# Patient Record
Sex: MALE | Race: WHITE
[De-identification: names, ages, dates, MRNs, and addresses within clinical notes are randomized per-mention and may not be internally consistent; named-entity substitution may affect disease eponyms.]

---

## 2017-04-17 ENCOUNTER — HOSPITAL ENCOUNTER (OUTPATIENT)
Dept: HOSPITAL 62 - END | Age: 37
Discharge: HOME | End: 2017-04-17
Attending: INTERNAL MEDICINE
Payer: OTHER GOVERNMENT

## 2017-04-17 VITALS — SYSTOLIC BLOOD PRESSURE: 156 MMHG | DIASTOLIC BLOOD PRESSURE: 84 MMHG

## 2017-04-17 DIAGNOSIS — K64.8: Primary | ICD-10-CM

## 2017-04-17 DIAGNOSIS — K60.2: ICD-10-CM

## 2017-04-17 DIAGNOSIS — E66.9: ICD-10-CM

## 2017-04-17 DIAGNOSIS — Z79.899: ICD-10-CM

## 2017-04-17 DIAGNOSIS — K62.5: ICD-10-CM

## 2017-04-17 DIAGNOSIS — I10: ICD-10-CM

## 2017-04-17 PROCEDURE — 88305 TISSUE EXAM BY PATHOLOGIST: CPT

## 2017-04-17 PROCEDURE — 0DBF8ZX EXCISION OF RIGHT LARGE INTESTINE, VIA NATURAL OR ARTIFICIAL OPENING ENDOSCOPIC, DIAGNOSTIC: ICD-10-PCS | Performed by: INTERNAL MEDICINE

## 2017-04-17 PROCEDURE — 45380 COLONOSCOPY AND BIOPSY: CPT

## 2017-04-17 NOTE — OPERATIVE REPORT
Operative Report


DATE OF SURGERY: 04/17/17


Operative Report: 


The risks, benefits and alternatives of the procedure including risks of 

bleeding, perforation requiring surgery are explained to the patient detail and 

informed consents obtained.  Patient is taken back to the endoscopy suite and 

placed in a left, lateral decubital position.  Timeout is called.  Propofol 

medications administered.  A rectal examination was done which did not reveal 

any masses, tears or fissures.  An Olympus videoscope was inserted into the 

patient's rectum.  The scope was then gradually advanced all the way to the 

cecum.  The cecum was identified by the usual anatomical landmarks of the 

ileocecal valve as well as the appendiceal office.  Photodocumentation is 

obtained.  The scope was then sequentially pulled back creative rest was of the 

colon including the ascending colon, hepatic flexure, transverse colon, splenic 

flexure, descending colon and finally into the rectosigmoid portions of the 

colon.  Retroflexion maneuvers performed.


PREOPERATIVE DIAGNOSIS: Rectal bleeding


POSTOPERATIVE DIAGNOSIS: Internal hemorrhoids, anal fissure.  No masses, AVMs, 

diverticulosis noted.  No malignancy noted.


OPERATION: Colonoscopy with biopsy


TISSUE REMOVED OR ALTERED: Right-sided colon mucosal biopsy obtained


COMPLICATIONS: 


None.


ESTIMATED BLOOD LOSS: none.


INTRAOPERATIVE FINDINGS: No masses, AVMs, diverticulosis noted.


PROCEDURE: 


Patient tolerated procedure well.


No immediate postprocedure complications are noted.


Patient is discharged in good condition.


Discharge date 04/17/2017.


Discharge diet: Regular.


Discharge activity: Regular.


2-3 week follow-up to discuss findings.


We'll await on biopsies.


Patient is instructed to call the office or proceed to the emergency room 

should there be any further problems or questions.

## 2019-01-05 ENCOUNTER — HOSPITAL ENCOUNTER (EMERGENCY)
Dept: HOSPITAL 62 - ER | Age: 39
Discharge: HOME | End: 2019-01-05
Payer: COMMERCIAL

## 2019-01-05 VITALS — SYSTOLIC BLOOD PRESSURE: 135 MMHG | DIASTOLIC BLOOD PRESSURE: 79 MMHG

## 2019-01-05 DIAGNOSIS — R11.2: Primary | ICD-10-CM

## 2019-01-05 DIAGNOSIS — Z87.891: ICD-10-CM

## 2019-01-05 DIAGNOSIS — R19.7: ICD-10-CM

## 2019-01-05 DIAGNOSIS — R10.9: ICD-10-CM

## 2019-01-05 DIAGNOSIS — I10: ICD-10-CM

## 2019-01-05 DIAGNOSIS — R42: ICD-10-CM

## 2019-01-05 LAB
ABSOLUTE LYMPHOCYTES# (MANUAL): 0.3 10^3/UL (ref 0.5–4.7)
ABSOLUTE MONOCYTES # (MANUAL): 0 10^3/UL (ref 0.1–1.4)
ABSOLUTE NEUTROPHILS# (MANUAL): 13.3 10^3/UL (ref 1.7–8.2)
ADD MANUAL DIFF: YES
ALBUMIN SERPL-MCNC: 4.9 G/DL (ref 3.5–5)
ALP SERPL-CCNC: 73 U/L (ref 38–126)
ALT SERPL-CCNC: 31 U/L (ref 21–72)
ANION GAP SERPL CALC-SCNC: 10 MMOL/L (ref 5–19)
AST SERPL-CCNC: 26 U/L (ref 17–59)
BASOPHILS NFR BLD MANUAL: 0 % (ref 0–2)
BILIRUB DIRECT SERPL-MCNC: 0.3 MG/DL (ref 0–0.4)
BILIRUB SERPL-MCNC: 1 MG/DL (ref 0.2–1.3)
BUN SERPL-MCNC: 24 MG/DL (ref 7–20)
CALCIUM: 9.7 MG/DL (ref 8.4–10.2)
CHLORIDE SERPL-SCNC: 101 MMOL/L (ref 98–107)
CO2 SERPL-SCNC: 26 MMOL/L (ref 22–30)
EOSINOPHIL NFR BLD MANUAL: 1 % (ref 0–6)
ERYTHROCYTE [DISTWIDTH] IN BLOOD BY AUTOMATED COUNT: 12.7 % (ref 11.5–14)
GLUCOSE SERPL-MCNC: 116 MG/DL (ref 75–110)
HCT VFR BLD CALC: 47.8 % (ref 37.9–51)
HGB BLD-MCNC: 16.2 G/DL (ref 13.5–17)
LIPASE SERPL-CCNC: 57 U/L (ref 23–300)
MCH RBC QN AUTO: 30 PG (ref 27–33.4)
MCHC RBC AUTO-ENTMCNC: 34 G/DL (ref 32–36)
MCV RBC AUTO: 88 FL (ref 80–97)
MONOCYTES % (MANUAL): 0 % (ref 3–13)
PLATELET # BLD: 332 10^3/UL (ref 150–450)
PLATELET COMMENT: ADEQUATE
POTASSIUM SERPL-SCNC: 5.2 MMOL/L (ref 3.6–5)
PROT SERPL-MCNC: 8.4 G/DL (ref 6.3–8.2)
RBC # BLD AUTO: 5.41 10^6/UL (ref 4.35–5.55)
RBC MORPH BLD: (no result)
SEGMENTED NEUTROPHILS % (MAN): 97 % (ref 42–78)
SODIUM SERPL-SCNC: 136.9 MMOL/L (ref 137–145)
TOTAL CELLS COUNTED BLD: 100
VARIANT LYMPHS NFR BLD MANUAL: 2 % (ref 13–45)
WBC # BLD AUTO: 13.7 10^3/UL (ref 4–10.5)

## 2019-01-05 PROCEDURE — 36415 COLL VENOUS BLD VENIPUNCTURE: CPT

## 2019-01-05 PROCEDURE — 85025 COMPLETE CBC W/AUTO DIFF WBC: CPT

## 2019-01-05 PROCEDURE — 96361 HYDRATE IV INFUSION ADD-ON: CPT

## 2019-01-05 PROCEDURE — 99284 EMERGENCY DEPT VISIT MOD MDM: CPT

## 2019-01-05 PROCEDURE — 80053 COMPREHEN METABOLIC PANEL: CPT

## 2019-01-05 PROCEDURE — 96374 THER/PROPH/DIAG INJ IV PUSH: CPT

## 2019-01-05 PROCEDURE — 83690 ASSAY OF LIPASE: CPT

## 2019-01-05 NOTE — ER DOCUMENT REPORT
ED General





- General


Chief Complaint: Vomiting/Diarrhea


Stated Complaint: VOMITING


Time Seen by Provider: 01/05/19 13:40


Notes: 





38-year-old male with past medical history of IBS, diverticulosis diagnosed on 

endoscopy, and appendectomy presents to the emergency department for vomiting, 

dizziness, lightheadedness, abdominal pain, and diarrhea since midnight today.  

He said he made chicken wings and pizza at home yesterday evening and went to 

bed before the symptoms started he states, though, that his family ate the same 

meal and none of them got sick.  He does work for [employer] and was at a fast food 

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  He complains of 

inability to tolerate oral fluids, reduced appetite, and some right lower 

quadrant pain.  He complains of several episodes of diarrhea and vomiting every 

time he tries to eat or drink.  He denies fever, chills, shortness of breath, 

chest pain, urinary symptoms, or any other symptoms.


TRAVEL OUTSIDE OF THE [place] IN LAST 30 DAYS: No





- HPI


Patient complains to provider of: vomiting and diarrhea





- Related Data


Allergies/Adverse Reactions: 


                                        





No Known Allergies Allergy (Verified 01/05/19 12:44)


   











Past Medical History





- General


Information source: Patient





- Social History


Smoking Status: Former Smoker


Family History: None


Patient has suicidal ideation: No


Patient has homicidal ideation: No





- Past Medical History


Cardiac Medical History: Reports: Hx Hypercholesterolemia, Hx Hypertension


   Denies: Hx Coronary Artery Disease, Hx Heart Attack


Pulmonary Medical History: 


   Denies: Hx Asthma, Hx Bronchitis, Hx COPD, Hx Pneumonia


Neurological Medical History: Denies: Hx Cerebrovascular Accident, Hx Seizures


Renal/ Medical History: Denies: Hx Peritoneal Dialysis


Musculoskeletal Medical History: Denies Hx Arthritis


Psychiatric Medical History: Reports: Hx Depression


Past Surgical History: Reports: Hx Appendectomy





- Immunizations


Hx Diphtheria, Pertussis, Tetanus Vaccination: Yes





Review of Systems





- Review of Systems


Constitutional: See HPI


EENT: No symptoms reported


Cardiovascular: See HPI


Respiratory: See HPI


Gastrointestinal: See HPI


Genitourinary: See HPI


Male Genitourinary: No symptoms reported


Musculoskeletal: No symptoms reported


Skin: No symptoms reported


Hematologic/Lymphatic: No symptoms reported


Neurological/Psychological: No symptoms reported





Physical Exam





- Vital signs


Vitals: 


                                        











Temp Pulse Resp BP Pulse Ox


 


 98.6 F   116 H  20   131/81 H  96 


 


 01/05/19 13:00  01/05/19 13:00  01/05/19 13:00  01/05/19 13:00  01/05/19 13:00














- Notes


Notes: 





Reviewed vital signs and nursing note as charted by RN. 


CONSTITUTIONAL: Well-appearing, well-nourished, acting appropriately for age   


HEAD: Normocephalic, atraumatic, no swelling


EYES: PERRL, Conjunctivae clear, no drainage, EOMI, no scleral icterus


ENT: External ears without lesions, External auditory canal is patent, airway 

patent, mucous membranes pink and moist


NECK: Supple, no masses


CARD: Regular rate and rhythm, no murmurs, no rubs, no gallops, capillary refill

< 2 seconds, symmetric pulses


RESP:   The lungs are clear to auscultation bilaterally, no wheezing, no rales, 

no rhonchi.  Respiratory rate and effort are normal, normal chest excursion.  No

respiratory distress, no retractions, no stridor, no nasal flaring, no accessory

muscle use. 


ABD/GI: Normal bowel sounds, non-distended, soft, non-tender, no rebound, no 

guarding, no palpable organomegaly


EXT: Normal ROM in all joints, non-tender to palpation, no effusions, no edema 


SKIN: Normal color for age and race, warm, dry, good turgor, no acute lesions 

noted


NEURO: No facial asymmetry, moves all extremities equally, motor and sensory 

function intact





Course





- Re-evaluation


Re-evalutation: 





01/05/19 14:46


This is a 38-year-old male who is well-appearing presents for diarrhea and 

vomiting since midnight this morning.  He also complains of abdominal pain, 

weakness, lightheadedness.  His last meal was chicken wings and pizza which he 

says he makes every Friday at home.  Family ate the same meal and there is no 

one else sick in the house.  He states he works at [employer] and he was at a fast 

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

that the environment was "disgusting".  Patient is currently receiving normal 

saline 2 L IV, has received Zofran for which he states he feels much much 

better.  Lab work has been drawn but is still pending.  Plan to reassess patient

after he receives fluids.  Physical exam was unremarkable.  Negative Keen 

sign, no tenderness to deep palpation in the right lower quadrant.  Patient has 

previous appendectomy.  Upon chart review patient had a prior scope that showed 

diverticulosis but anatomic location was not identified on the note.


01/05/19 15:50


Reassessed patient and he feels much better.  Labs shows a mild leukocytosis 

consistent with stress reaction.  Patient did not look toxic and I do not 

suspect he has a concerning infectious etiology from his symptoms.  Patient was 

p.o. challenged and reported no symptoms at all.  Repeat vitals completed and 

heart rate was 100.  Because patient looks so well and reports feeling much 

better I am comfortable discharging him with his current vital signs.  I gave 

patient return precautions.  Patient is stable for discharge.





- Vital Signs


Vital signs: 


                                        











Temp Pulse Resp BP Pulse Ox


 


 97.6 F   100   18   135/79 H  85 L


 


 01/05/19 15:48  01/05/19 15:48  01/05/19 15:48  01/05/19 15:48  01/05/19 15:48














- Laboratory


Result Diagrams: 


                                 01/05/19 14:12





                                 01/05/19 14:12


Laboratory results interpreted by me: 


                                        











  01/05/19 01/05/19





  14:12 14:12


 


WBC  13.7 H 


 


Seg Neuts % (Manual)  97 H 


 


Lymphocytes % (Manual)  2 L 


 


Monocytes % (Manual)  0 L 


 


Abs Neuts (Manual)  13.3 H 


 


Abs Lymphs (Manual)  0.3 L 


 


Abs Monocytes (Manual)  0.0 L 


 


Sodium   136.9 L


 


Potassium   5.2 H


 


BUN   24 H


 


Glucose   116 H


 


Total Protein   8.4 H














Discharge





- Discharge


Clinical Impression: 


Diarrhea


Qualifiers:


 Diarrhea type: unspecified type Qualified Code(s): R19.7 - Diarrhea, 

unspecified





Vomiting


Qualifiers:


 Vomiting type: unspecified Vomiting Intractability: non-intractable Nausea 

presence: with nausea Qualified Code(s): R11.2 - Nausea with vomiting, 

unspecified





Condition: Good


Disposition: HOME, SELF-CARE


Instructions:  Antinausea Medication ([facility]), Diarrhea, Nonspecific ([facility]), 

Vomiting ([facility])


Additional Instructions: 


Your symptoms are likely due to a viral illness and should resolve in the next 

1-2 days.  You can take over-the-counter Imodium as needed for diarrhea per box 

instructions.  Continue to stay hydrated with plenty of solution such as 

Gatorade (but please use 50:50 mixture with water) or Pedialyte.  You are being 

prescribed Zofran to take as needed for nausea and vomiting.  Please return if 

you develop severe abdominal pain, continue to have profuse diarrhea, pass out, 

become unable to tolerate any oral fluids for 12 more hours, or any other 

symptoms that are concerning to you.


Prescriptions: 


Ondansetron [Zofran Odt 4 mg Tablet] 4 mg PO Q4HP PRN #[id number] tab.rapdis


 PRN Reason: 


Forms:  Return to Work


Referrals: 


ADRIANA MORROW PA-C [Primary Care Provider] - Follow up as needed

## 2019-08-30 NOTE — RADIOLOGY REPORT (SQ)
EXAM DESCRIPTION:  C SP 4 OR 5 VIEWS



COMPLETED DATE/TIME:  8/30/2019 7:50 am



REASON FOR STUDY:  CERVICALGIA M54.2  CERVICALGIA



COMPARISON:  None.



NUMBER OF VIEWS:  Five views.



TECHNIQUE:  AP, lateral, obliques and odontoid radiographic images acquired of the cervical spine.



LIMITATIONS:  None.



FINDINGS:  MINERALIZATION: Normal.

ALIGNMENT: Anatomic.

VERTEBRAE: Vertebral bodies of normal height.

DISCS: Mild disc space narrowing at C5-C6 and C6-C7.

FORAMINA: No osteophytes or foraminal narrowing.

LATERAL AND POSTERIOR ELEMENTS: Facets, lateral masses and spinous processes without significant find
ings.

HARDWARE: None in the spine.

SOFT TISSUES: No masses or calcifications. Lung apices clear.

OTHER: No other significant finding.



IMPRESSION:  Mild disc space narrowing at C5-C6 and C6-C7.  No other significant findings.



TECHNICAL DOCUMENTATION:  JOB ID:  [id number]

 2011 [employer]- All Rights Reserved



Reading location - IP/workstation name: [email or web address]